# Patient Record
Sex: FEMALE | Race: WHITE
[De-identification: names, ages, dates, MRNs, and addresses within clinical notes are randomized per-mention and may not be internally consistent; named-entity substitution may affect disease eponyms.]

---

## 2019-02-21 ENCOUNTER — HOSPITAL ENCOUNTER (EMERGENCY)
Dept: HOSPITAL 1 - ED | Age: 69
Discharge: ANOTHER HOSPITAL | End: 2019-02-21
Payer: COMMERCIAL

## 2019-02-21 VITALS — SYSTOLIC BLOOD PRESSURE: 114 MMHG | DIASTOLIC BLOOD PRESSURE: 52 MMHG

## 2019-02-21 VITALS — BODY MASS INDEX: 25.71 KG/M2 | WEIGHT: 160 LBS | HEIGHT: 66 IN
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
## 2019-02-21 DIAGNOSIS — R42: ICD-10-CM

## 2019-02-21 DIAGNOSIS — E11.9: ICD-10-CM

## 2019-02-21 DIAGNOSIS — Z87.19: ICD-10-CM

## 2019-02-21 DIAGNOSIS — E86.0: ICD-10-CM

## 2019-02-21 DIAGNOSIS — Z88.8: ICD-10-CM
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
## 2019-02-21 DIAGNOSIS — Z88.2: ICD-10-CM
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
## 2019-02-21 DIAGNOSIS — F17.210: ICD-10-CM

## 2019-02-21 DIAGNOSIS — Z88.5: ICD-10-CM

## 2019-02-21 DIAGNOSIS — I10: ICD-10-CM

## 2019-02-21 DIAGNOSIS — Z88.0: ICD-10-CM

## 2019-02-21 DIAGNOSIS — S32.019A: Primary | ICD-10-CM

## 2019-02-21 LAB
ALBUMIN SERPL-MCNC: 3.5 G/DL (ref 3.4–5)
ALP SERPL-CCNC: 137 U/L (ref 46–116)
ALT SERPL-CCNC: 58 U/L (ref 14–59)
AST SERPL-CCNC: 65 U/L (ref 15–37)
BASOPHILS NFR BLD: 0 % (ref 0–2)
BILIRUB SERPL-MCNC: 0.96 MG/DL (ref 0.2–1)
BUN SERPL-MCNC: 30 MG/DL (ref 7–18)
CALCIUM SERPL-MCNC: 9.6 MG/DL (ref 8.5–10.1)
CHLORIDE SERPL-SCNC: 100 MMOL/L (ref 98–107)
CO2 SERPL-SCNC: 25.8 MMOL/L (ref 21–32)
CREAT SERPL-MCNC: 1.4 MG/DL (ref 0.6–1)
DACRYOCYTES BLD QL SMEAR: (no result)
ERYTHROCYTE [DISTWIDTH] IN BLOOD BY AUTOMATED COUNT: 17.5 % (ref 11.5–14.5)
GFR SERPLBLD BASED ON 1.73 SQ M-ARVRAT: 40 ML/MIN
GLUCOSE SERPL-MCNC: 114 MG/DL (ref 74–106)
MICROSCOPIC UR-IMP: NO
MONOCYTES NFR BLD: 34 % (ref 0–7)
NEUTS BAND NFR BLD: 5 % (ref 0–10)
NEUTS SEG NFR BLD MANUAL: 45 % (ref 37–75)
OVALOCYTES BLD QL SMEAR: (no result)
PLAT MORPH BLD: (no result)
PLATELET # BLD: 115 X10^3MCL (ref 130–400)
POTASSIUM SERPL-SCNC: 4.2 MMOL/L (ref 3.5–5.1)
PROT SERPL-MCNC: 8.2 G/DL (ref 6.4–8.2)
RBC # UR STRIP.AUTO: NEGATIVE /UL
RBC MORPH BLD: (no result)
SODIUM SERPL-SCNC: 135 MMOL/L (ref 136–145)
UA SPECIFIC GRAVITY: 1.01 (ref 1–1.03)

## 2019-03-27 ENCOUNTER — HOSPITAL ENCOUNTER (INPATIENT)
Dept: HOSPITAL 1 - ED | Age: 69
LOS: 5 days | Discharge: NURSING, REHAB OR LONG-TERM CARE | DRG: 871 | End: 2019-04-01
Attending: INTERNAL MEDICINE | Admitting: INTERNAL MEDICINE
Payer: COMMERCIAL

## 2019-03-27 VITALS — DIASTOLIC BLOOD PRESSURE: 44 MMHG | SYSTOLIC BLOOD PRESSURE: 93 MMHG

## 2019-03-27 VITALS — DIASTOLIC BLOOD PRESSURE: 36 MMHG | SYSTOLIC BLOOD PRESSURE: 110 MMHG

## 2019-03-27 VITALS
WEIGHT: 156 LBS | BODY MASS INDEX: 24.48 KG/M2 | HEIGHT: 67 IN | HEIGHT: 67 IN | BODY MASS INDEX: 24.48 KG/M2 | WEIGHT: 156 LBS

## 2019-03-27 VITALS — SYSTOLIC BLOOD PRESSURE: 83 MMHG | DIASTOLIC BLOOD PRESSURE: 28 MMHG

## 2019-03-27 VITALS — DIASTOLIC BLOOD PRESSURE: 53 MMHG | SYSTOLIC BLOOD PRESSURE: 97 MMHG

## 2019-03-27 DIAGNOSIS — K72.90: ICD-10-CM

## 2019-03-27 DIAGNOSIS — E86.0: ICD-10-CM

## 2019-03-27 DIAGNOSIS — J69.0: ICD-10-CM

## 2019-03-27 DIAGNOSIS — K85.90: ICD-10-CM

## 2019-03-27 DIAGNOSIS — A41.9: Primary | ICD-10-CM

## 2019-03-27 DIAGNOSIS — N17.9: ICD-10-CM

## 2019-03-27 LAB
ALBUMIN SERPL-MCNC: 3.8 G/DL (ref 3.4–5)
ALP SERPL-CCNC: 117 U/L (ref 46–116)
ALT SERPL-CCNC: 38 U/L (ref 14–59)
AST SERPL-CCNC: 41 U/L (ref 15–37)
BASOPHILS NFR BLD: 0.1 % (ref 0–2)
BILIRUB SERPL-MCNC: 1.68 MG/DL (ref 0.2–1)
BUN SERPL-MCNC: 112 MG/DL (ref 7–18)
CALCIUM SERPL-MCNC: 10 MG/DL (ref 8.5–10.1)
CHLORIDE SERPL-SCNC: 89 MMOL/L (ref 98–107)
CO2 SERPL-SCNC: 11.9 MMOL/L (ref 21–32)
CREAT SERPL-MCNC: 7.4 MG/DL (ref 0.6–1)
ERYTHROCYTE [DISTWIDTH] IN BLOOD BY AUTOMATED COUNT: 17.9 % (ref 11.5–14.5)
GFR SERPLBLD BASED ON 1.73 SQ M-ARVRAT: 6 ML/MIN
GLUCOSE SERPL-MCNC: 144 MG/DL (ref 74–106)
MICROSCOPIC UR-IMP: YES
PLATELET # BLD: 130 X10^3MCL (ref 130–400)
POTASSIUM SERPL-SCNC: 4.9 MMOL/L (ref 3.5–5.1)
PROT SERPL-MCNC: 8.2 G/DL (ref 6.4–8.2)
RBC # UR STRIP.AUTO: NEGATIVE /UL
SODIUM SERPL-SCNC: 125 MMOL/L (ref 136–145)
UA SPECIFIC GRAVITY: 1.02 (ref 1–1.03)

## 2019-03-27 NOTE — NUR
UPON ASSESSMENT OF SKIN WHILE PERFORMING MOISE CATHETER INSERTION SKIN
BREAKDOWN NOTED WITHIN THE GLUTEAL FOLD. PT POSITIONS SELF FOR COMFORT WHEN
NEEDED.

## 2019-03-27 NOTE — NUR
PT BROUGHT IN BY [facility] AFTER CALLING 911 FOR FLANK PAIN. PER MEDICS PT
HAS NOT URINATED IN 24HRS. PT STATES THAT SHE BEGAN AN UNRECALLED ANTIBIOTIC
YESTERDAY AROUND 1200 AND HASN'T BEEN ABLE TO URINATE SINCE THEN. PT STATES
THAT SHE WAS DIAGNOSED WITH A BLADDER INFECTION THE LAST TIME SHE SAW HER PCP
WHICH WAS LAST WEEK. PT STATES THAT SHE ALSO HAS BEEN VOMITING X2 DAYS. PT
ACTIVELY VOMITING DURING ASSESSMENT. PT NOTED WITH THICK YELLOW/GREEN VOMIT. PT
AOX3. WHEN ASKED PT WHAT THE MONTH IS SHE STATES "APRIL", PT ASKED WHO THE [place]
PRESIDENT IS AND PT STATES "I DON'T KNOW". SEVERAL MINUTES LATER PT STATES
"TYSHAWN CARRILLO, HOW COULD I FORGET". PT REORIENTED APPROPRIATELY. PT ABDOMEN
SOFT AND TENDER TO PALPATION IN MIDDLE AND RLQ. PT ALSO STATES TO CHRONIC BACK
PAIN. PT LIVES AT HOME BUT STATES THAT HER NEIGHBOR BROOKE HELPS HER OUT. PT
SLOW TO ANSWER QUESTIONS AND HAS TO BE REFOCUSED OFTEN.

## 2019-03-27 NOTE — NUR
PT RETURNED TO [facility] FROM CT WITHOUT INCIDENT. IV FLUIDS INFUSING NO PROB. PT IS
AAOX3, CALM AND COOPERATIVE, PT ABLE TO FOLLOW COMMANDS AND VERBALIZE NEEDS. PT
IN NO DISTRESS, RESP E/U. PT ON FULL CM, VSS, NSR, PT GOWNED AND IN POSITION OF
COMFORT, BED IN LOWEST POSITION AND CALL BELL WITHIN REACH. WILL CONT TO
MONITOR.

## 2019-03-27 NOTE — NUR
PT TRANSPORTED TO TELE FLOOR BY HAI SON AND NACHO EMT ON PORTABLE CM NO
DISTRESS. IVF INFUSING WITH NO PROBLEM. NAKUL SON WILL CONTINUE CLINDAMYCIN
INFUSION AND WILL START VANCOMYCIN AS ORDERED AND DISCUSSED WITH HER. PT DC'D
FROM ED IN NO DISTRESS.

## 2019-03-27 NOTE — NUR
PT LAYING DOWN IN BED RESTING. HOB UP, RESPIRATIONS EVEN AND UNLABORED ON ROOM
AIR. PT LOOKS TO BE IN NO ACUTE DISTRESS AT THIS TIME. FAMILY MEMBERS AT
BEDSIDE. PT DENIES ANY NAUSEA AT THIS TIME. BED IN LOWST POSITION. CALL LIGHT
WITHIN REACH. WILL ENDORSE TO ONCOMING SHIFT.

## 2019-03-27 NOTE — NUR
PT IS RESTING IN BED. WITH FAMILY AT BEDSIDE. ALERT AND ORIENTED X 3. WITH
SOME FORGETFULNESS AND CONFUSION. CLEAR LUNG SOUNDS ON AUSCULTATIONS
BILATERALLY. ROOM AIR. GENERALIZED WEAKNESS. STILL HAS IV SITE HEPLOCK IN THE
LEFT UPPER ARM. STILL GETTING FLAGYL IV AND LEVAQUIN IV. STILL HAVING BACK
PAIN 6/10. WILL MONITOR.

## 2019-03-27 NOTE — NUR
PERFORMED MOISE PLACEMENT USING STERILE TECHNIQUE. INITIAL URINE OUTPUT 100ML.
DR KUHN AWARE. URINE SENT TO LAB FOR TESTING.

## 2019-03-27 NOTE — NUR
PT MEDICATED PER EMAR. PT RESTING IN A POSITION OF COMFORT AT THIS TIME. RESP
EVEN AND UNLABORED, NO ACUTE DISTRESS NOTED. PT ON FULL CARDIAC MONITOR.

## 2019-03-27 NOTE — NUR
PT TRANSFERED FROM ED VIA GURNEY ACCOMPANIED BY RN. PT IS AWAKE, ALERT AND
ORIENTED. PT ORIENTED TO ROOM AND UNIT. PT LOOKS TO BE IN NO ACUTE DISTRESS
AT THIS TIME. PT COMPLAINING OF 10/10 PAIN IN THE BACK. IV TO LEFT SHOULDER IS
PATENT WITH NO SIGNS OF ERYTHEMA OR SWELLING. CALL LIGHT WITHIN REACH. WILL
CONTINUE TO MONTIOR.

## 2019-03-27 NOTE — NUR
PT RESTING IN A POSITION OF COMFORT. RESP EVEN AND UNLABORED, NO ACUTE DISTRESS
NOTED. PT REQUESTING PAIN MEDIATION, PER DR OLSON WE HAVE TO WAIT FOR LAB
RESULTS TO MEDICATE FOR PAIN. PT POSITIONED FOR COMFORT.

## 2019-03-28 VITALS — SYSTOLIC BLOOD PRESSURE: 98 MMHG | DIASTOLIC BLOOD PRESSURE: 39 MMHG

## 2019-03-28 VITALS — SYSTOLIC BLOOD PRESSURE: 138 MMHG | DIASTOLIC BLOOD PRESSURE: 30 MMHG

## 2019-03-28 VITALS — SYSTOLIC BLOOD PRESSURE: 103 MMHG | DIASTOLIC BLOOD PRESSURE: 50 MMHG

## 2019-03-28 VITALS — SYSTOLIC BLOOD PRESSURE: 109 MMHG | DIASTOLIC BLOOD PRESSURE: 47 MMHG

## 2019-03-28 LAB
BASOPHILS NFR BLD: 0 % (ref 0–2)
BUN SERPL-MCNC: 105 MG/DL (ref 7–18)
BURR CELLS BLD QL SMEAR: (no result)
CALCIUM SERPL-MCNC: 9.1 MG/DL (ref 8.5–10.1)
CHLORIDE SERPL-SCNC: 94 MMOL/L (ref 98–107)
CO2 SERPL-SCNC: 16.6 MMOL/L (ref 21–32)
CREAT SERPL-MCNC: 6.8 MG/DL (ref 0.6–1)
DACRYOCYTES BLD QL SMEAR: (no result)
ERYTHROCYTE [DISTWIDTH] IN BLOOD BY AUTOMATED COUNT: 18.3 % (ref 11.5–14.5)
GFR SERPLBLD BASED ON 1.73 SQ M-ARVRAT: 6 ML/MIN
GLUCOSE SERPL-MCNC: 109 MG/DL (ref 74–106)
MONOCYTES NFR BLD: 18 % (ref 0–7)
NEUTS BAND NFR BLD: 5 % (ref 0–10)
NEUTS SEG NFR BLD MANUAL: 71 % (ref 37–75)
OVALOCYTES BLD QL SMEAR: (no result)
PLAT MORPH BLD: (no result)
PLATELET # BLD: 124 X10^3MCL (ref 130–400)
POTASSIUM SERPL-SCNC: 4.9 MMOL/L (ref 3.5–5.1)
RBC MORPH BLD: (no result)
SODIUM SERPL-SCNC: 129 MMOL/L (ref 136–145)

## 2019-03-28 NOTE — NUR
NOTED A SMALL OPEN WOUND TO PT LEFT BUTTOCKS. PICTURE IN CHART. WOUND OPEN TO
AIR. PT MADE COMFORTABLE IN BED. CALL LIGHT WITHIN REACH. WILL CONTINUE TO
MONTIOR.

## 2019-03-28 NOTE — NUR
RECEIVED PT IN BED AAOX4 PT'S LETHRAGIC AT THE MOMENT , LUNG SOUNDS CTA ABD
SOFT BS ACTIVE X4, MOISE TO GRAVITY DRAINING WELL , HL INTACT FLUSHING WELL ,
WILL CON'T TO MONITOR AND ASSIST PT WITH CARE .

## 2019-03-28 NOTE — NUR
PT C/O MODERATE BACK PAIN 10/10. MEDICATED WITH DILAUDID 0.5 MG IVPUSH. WILL
MONITOR. PT TOOK HER LACTULOSE THIS MORNING THAT WAS REFUSED LAST NIGHT DOSE.
WILL MONITOR.

## 2019-03-28 NOTE — NUR
PT IS LAYING DOWN IN BED WITH HOB UP. PT LOOKS TO BE IN NO ACUTE DISTRESS AT
THIS TIME. FAMILY MEMBERS AT BEDSIDE. PT REPOSITIONED. MOISE CARE COMPLETED.
IV SITE IS PATENT WITH NO SIGNS OF ERYTHEMA OR SWELLING. CALL LIGHT WITHIN
REACH. WILL ENDORSE TO ONCOMING SHIFT.

## 2019-03-28 NOTE — NUR
PT IS RESTING. HAS INTERMITTENT MODERATE TO SEVERE BACK PAIN DUE TO TI
COMPRESSION. PAIN MANAGEMENT IS DIALUDID 0.5 MG IVPUSH. AND WAS EFFECTIVE.
STILL GETTING FLAGYL AND LEVAQUIN IV WITHOUT ADVERSE REACTION. MADE
COMFORTABLE IN BED. TURN AND REPOSITION.

## 2019-03-29 VITALS — SYSTOLIC BLOOD PRESSURE: 91 MMHG | DIASTOLIC BLOOD PRESSURE: 41 MMHG

## 2019-03-29 VITALS — DIASTOLIC BLOOD PRESSURE: 47 MMHG | SYSTOLIC BLOOD PRESSURE: 98 MMHG

## 2019-03-29 VITALS — DIASTOLIC BLOOD PRESSURE: 43 MMHG | SYSTOLIC BLOOD PRESSURE: 101 MMHG

## 2019-03-29 VITALS — SYSTOLIC BLOOD PRESSURE: 94 MMHG | DIASTOLIC BLOOD PRESSURE: 47 MMHG

## 2019-03-29 VITALS — DIASTOLIC BLOOD PRESSURE: 56 MMHG | SYSTOLIC BLOOD PRESSURE: 113 MMHG

## 2019-03-29 LAB
ALBUMIN SERPL-MCNC: 3 G/DL (ref 3.4–5)
ALP SERPL-CCNC: 112 U/L (ref 46–116)
ALT SERPL-CCNC: 28 U/L (ref 14–59)
AST SERPL-CCNC: 37 U/L (ref 15–37)
BASOPHILS NFR BLD: 0 % (ref 0–2)
BILIRUB DIRECT SERPL-MCNC: 0.52 MG/DL (ref 0–0.2)
BILIRUB SERPL-MCNC: 1.41 MG/DL (ref 0.2–1)
BUN SERPL-MCNC: 79 MG/DL (ref 7–18)
BURR CELLS BLD QL SMEAR: (no result)
CALCIUM SERPL-MCNC: 9 MG/DL (ref 8.5–10.1)
CHLORIDE SERPL-SCNC: 100 MMOL/L (ref 98–107)
CO2 SERPL-SCNC: 23.8 MMOL/L (ref 21–32)
CREAT SERPL-MCNC: 3.8 MG/DL (ref 0.6–1)
ERYTHROCYTE [DISTWIDTH] IN BLOOD BY AUTOMATED COUNT: 18.6 % (ref 11.5–14.5)
GFR SERPLBLD BASED ON 1.73 SQ M-ARVRAT: 13 ML/MIN
GLUCOSE SERPL-MCNC: 144 MG/DL (ref 74–106)
LIPASE SERPL-CCNC: 1220 IU/L (ref 73–393)
MONOCYTES NFR BLD: 18 % (ref 0–7)
NEUTS BAND NFR BLD: 1 % (ref 0–10)
NEUTS SEG NFR BLD MANUAL: 80 % (ref 37–75)
PLAT MORPH BLD: (no result)
PLATELET # BLD: 86 X10^3MCL (ref 130–400)
POTASSIUM SERPL-SCNC: 3.3 MMOL/L (ref 3.5–5.1)
PROT SERPL-MCNC: 6.6 G/DL (ref 6.4–8.2)
RBC MORPH BLD: (no result)
SODIUM SERPL-SCNC: 136 MMOL/L (ref 136–145)

## 2019-03-29 NOTE — NUR
NO CHANGES OF CONDITION NOTED, ALL DUE MEDS GIVEN NO REACTION NOTED, HL INTACT
FLUSHING WELL . MOISE TO GRAVITY DRAINING WELL. CALL LIGHT WITHIN PT'S REACH ,
TURNED PT Q2 HRS.

## 2019-03-29 NOTE — NUR
PT RESTING IN BED WITH NO APPARENT SIGNS OF DISTRESS. A/A/O./X4, SPEECH CLEAR
AND APPRORPIATE. DENIES HA/DIZZINESS. ON TELE, DENIES CHEST PAIN/PRESSURE.
PALP PULSES. RESPIRATIONS EQUAL AND UNLABORED. ON RA, DENIES SOB. ABDOMEN SOFT
AND NONTEDER. ACTIVE BS. EMILY N/V. PT REPORTS DIARRHEA AT THIS TIME. MOISE
CATHETER WITH LEILANI URINE DRAINING TO GRAVITY. GENERALIZED WEAKNESS. BESREST
AT THIS TIME. OPTIFOAM NOTED TO BUTTOCKS, CDI. PT C/O OF PAIN, WILL MEDICATE
ACCORDINGLY. IV PATENT AND INTACT. BED IN LOW POSITION. CALL LIGHT IN REACH.
WILL COTNINUE TO MONITIOR

## 2019-03-29 NOTE — NUR
AM MEDICATIONS GIVEN PT TOLERATED WELL. RESRPIATIONS EQUAL AND UNLABORED. ON
RA, DENIES SOB. BED IN LOW POSITION. CALL LIGHT IN REACH. WILL COTNINE TO
MONITOR

## 2019-03-29 NOTE — NUR
PT RESTING IN BED WITH NO APPARENT SIGNS OF DISTRESS. ON TELE. DENIES CHEST
PAIN/PRESSURE. RESPIRAITONS EQUAL AND UNLABORED. ON RA, DENIES SOB.
RESPIRATIONS EQUAL AND UNLABORED. ON RA, DENIES SOB. MOISE CATH IN PLACE QWITH
LEILANI URINE, WNL. GENERALIZED WEAKENSS. NO ACUTE SKIN CHANGES NOTED. IV PATENT
AND INTACT. BED IN LOW POSITION. CALL LIGHT IN REACH. WILL ENDORSE TO NIGHT
SHIFT RN

## 2019-03-29 NOTE — NUR
RECEIVED REPORT FROM YAEL SON. PT IS RESTING IN BED. FLAGYL INFUSING. STARTED
THE IV D5NS+20KCL AT 80 ML PER HOUR,  PT HAS FAIR APPETITE AND HX OF
DIABETES. AND HER K- LEVEL 3.3. TURNED AND REPOSITIONED. WILL MONITOR. IV IN
THE RIGHT WRIST.

## 2019-03-29 NOTE — NUR
PM MEDICATIONS GIVEN. PT TOLERATED WELL. RESPIRATIONS EQUAL AND UNLABNORED.ON
RA, DENIES SOB. BED IN LOW POSITION. CALL LIGHT IN REACH. WILL CONTINUE TO
MONITOR

## 2019-03-29 NOTE — NUR
Intervention/RD recommendation
1. Continue regular diet as ordered and as tolerated per pt. request.

## 2019-03-29 NOTE — NUR
PT C/O OF PAIN, MEDICATED PER EMAR. VITALS STABLE. RESPIRTIONS EQUAL AND
UNLABORED. ON RA, DENIES SOB. BED IN LOW POSITION. CALL LIGHT IN REACH. WILL
CONTINUE TO MONITOR

## 2019-03-29 NOTE — NUR
RC'D PT RESTING IN BED WITH NO APPARENT SIGNS OF DISTRESS. A/A/O/X4, SPEECH
CLEAR AND APPROPRIATE. DENIES HA/DIZZINESS. ON TELE, DENIES CHEST
PAIN/PRESSURE. PALP PULSES, NO EDEMA NOTED. RESPIRATIONS EQUAL AND UNLABORED.
LUNGS CTA. ON RA, DENIES SOB. ABDOMEN SOFT AND NONTENDER. ACTIVE BS. DENIES
N/V. MOISE CATHETER WITH LEILANI URINE DRAINING TO GRAVITY, WNL. GENERALIZED
WEAKNESS. AMB WTIH FWW. IV PATENT AND INTACT. BED IN LOW POSITION. CALL LIGHT
IN REACH. WILL CONTINUE TO MONITOR

## 2019-03-29 NOTE — NUR
PT RESTING IN BED WITH NO APPARENT SIGNS OF DISTRESS. RESPRIATIONS EQUAL AND
UNLABORED. DENIES DIZZINESS/HA. BED IN LOW PSOITION. CALL LIGHT IN REACH. WILL
CONTINUE TO MONITOR

## 2019-03-29 NOTE — NUR
Initial Nutrition Assessment
 
Dx: ARF, sepsis
PMHx: HTN, DM, Cirrhosis, chronic anemia, leukemia, possibly CLL
PSHx: None
Labs: (3/29/19) Na 136, K 3.3L, BG 144H, BUN 79H,  Cr 3.8H (both trending
down), Lipase 1220H, WBC 21.4H, H/H 14.8/43
BG readings (3/27-3/29):  mg/dL, all readings <180 mg/dL, insulin
coverage provided PRN.
Meds: Cephulac, Citrate of magnesia, D50%, Dilaudid, Elavil, Flagyl, Humulin,
Levquin, Norco, Protonix, Reglan, Senokot, Zofran
Diet: Regular
PO Intake: (3/29) B/L: 50% (3/28) L: 20%
Ht: 67" (170 cm)  Wt: 156# (70.8 kg)  BMI: 24.4 (WNL)
IBW: 135#  %IBW: 105%   UBW:170#
Age: 69 y/o female
Food Allergies: NKFA
Skin: Intact
Harjinder: 19
Edema: None
GI: Last BM x 1 (3/29)
 
Per H&P, pt. admitted with reports of right flank pain associated with recent
poor PO intake. Denies any nausea or vomiting upon admission. Noted with
elevated BUN, Cr, and lipase levels per provider notes. Per provider progress
notes, pt.'s abdominal pain has improved.
Abdominal/Pelvis CT scan conducted on 3/27/19 with findings of diverticulosis
w/o CT evidence of diverticulitis and peripancreatic inflammatory changes per
provider notes.
Pt. endorses improved appetite since admission and no GI distress. Reports a
-10# weight loss in 1 month due to initation of diuretics. Notified pt. of the
benefits of following CCHO diet and changing her current diet order (Regular)
for optimal BG management, but pt. declined, stating she will pick and choose
what she consumes. Recommended ONS Glucerna; states she dislikes any
nutritional supplements. Offerred to change her food preferences; food
preferences reflected on computrition.
 
T: Unintentional weight loss -10# x1 mo, poor PO intake > 3 days
Problem with: No c/o N/V/D/C
Problems with: Chewing: N Swallowing: N
Current appetite: Poor to fair
Recent wt change: -10# x1 month  %wt change: 5.8% x 1 month; significant
weight loss
Vitamin/Supplement use: None
Special diet at home: Regular
Physical activity: None at this time d/t acute illness
Education: No diet education provided during visit; pt. declined at this time.
 
Estimated Nutritional Needs Based on actual body weight 70.8 kg:
Energy: 2149-1327   kcal/d  (25-27 kcal/kg- adult maintenance)
Protein: 70-85 g/d  (1.0-1.2 g/kg)-maintenance and preservation of lean body
mass
Fluid: 1938-7482  ml/d  (1 ml/kcal-fluid balance) or per doctor
 
Nutrition Diagnosis
1. Inadequate PO intake r/t reported poor appetite AEB documented PO intake
meeting <75% estimated calorie and protein needs.
2. Unintentional weight loss r/t potential food-drug interactions AEB
subjective significant weight loss 10# x 1 month (5.8%) from new medication
(diuretic) regimen.
 
Intervention/RD recommendation
1. Continue regular diet as ordered and as tolerated per pt. request.
 
Monitor/Evaluate
Goal: PO intake at least 75 % of estimated needs
Monitor: PO intake, Labs, GI function, diet tolerance, skin integrity
F/U in 3-5 days as moderate risk (4/1-4/3)

## 2019-03-29 NOTE — NUR
PT RESTING IN BED WITH NO APPARENT SIGNS OF DISTRESS. RESPIRATIONS EQUAL AND
UNLABORED. ON RA, DENIES SOB. BED IN LOW POSITION. CALL LIGHT IN REACH. WILL
CONTINUE TO MONITOR

## 2019-03-30 VITALS — DIASTOLIC BLOOD PRESSURE: 40 MMHG | SYSTOLIC BLOOD PRESSURE: 101 MMHG

## 2019-03-30 VITALS — SYSTOLIC BLOOD PRESSURE: 114 MMHG | DIASTOLIC BLOOD PRESSURE: 62 MMHG

## 2019-03-30 VITALS — DIASTOLIC BLOOD PRESSURE: 52 MMHG | SYSTOLIC BLOOD PRESSURE: 105 MMHG

## 2019-03-30 VITALS — DIASTOLIC BLOOD PRESSURE: 53 MMHG | SYSTOLIC BLOOD PRESSURE: 111 MMHG

## 2019-03-30 VITALS — SYSTOLIC BLOOD PRESSURE: 108 MMHG | DIASTOLIC BLOOD PRESSURE: 51 MMHG

## 2019-03-30 LAB
ALBUMIN SERPL-MCNC: 2.7 G/DL (ref 3.4–5)
ALP SERPL-CCNC: 108 U/L (ref 46–116)
ALT SERPL-CCNC: 23 U/L (ref 14–59)
AST SERPL-CCNC: 32 U/L (ref 15–37)
BASOPHILS NFR BLD: 0 % (ref 0–2)
BILIRUB DIRECT SERPL-MCNC: 0.4 MG/DL (ref 0–0.2)
BILIRUB SERPL-MCNC: 1.2 MG/DL (ref 0.2–1)
BUN SERPL-MCNC: 40 MG/DL (ref 7–18)
CALCIUM SERPL-MCNC: 8 MG/DL (ref 8.5–10.1)
CHLORIDE SERPL-SCNC: 102 MMOL/L (ref 98–107)
CO2 SERPL-SCNC: 26.7 MMOL/L (ref 21–32)
CREAT SERPL-MCNC: 1.8 MG/DL (ref 0.6–1)
ERYTHROCYTE [DISTWIDTH] IN BLOOD BY AUTOMATED COUNT: 17.9 % (ref 11.5–14.5)
GFR SERPLBLD BASED ON 1.73 SQ M-ARVRAT: 30 ML/MIN
GLUCOSE SERPL-MCNC: 168 MG/DL (ref 74–106)
MONOCYTES NFR BLD: 29 % (ref 0–7)
NEUTS BAND NFR BLD: 1 % (ref 0–10)
NEUTS SEG NFR BLD MANUAL: 62 % (ref 37–75)
PLAT MORPH BLD: (no result)
PLATELET # BLD: 63 X10^3MCL (ref 130–400)
POTASSIUM SERPL-SCNC: 2.8 MMOL/L (ref 3.5–5.1)
PROT SERPL-MCNC: 6.3 G/DL (ref 6.4–8.2)
RBC MORPH BLD: NORMAL
SODIUM SERPL-SCNC: 140 MMOL/L (ref 136–145)
VARIANT LYMPHS NFR BLD MANUAL: 2 %

## 2019-03-30 NOTE — NUR
NOTIFIED BY TELE MONITOR THAT PT CURRENTLY IN SVT. PT ASYMPTOMATIC AT THIS
TIME. VITALS STABLE. WILL NOTIFY MD FOR ORDERS

## 2019-03-30 NOTE — NUR
RC'D PT RESTING IN BED WITH NO APPARENT SIGNS OF DISTRESS. A/A/O/X4, SPEECH
CLEAR AND APPROPRIATE. DENIES HA/DIZZINESS. ON TELE, DENIES CHEST
PAIN/PRESSURE. PALP PULSES, NO EDEMA NOTED. RESPIRATIONS EQUAL AND UNLABORED.
LUNGS CTA. ON RA, DENIES SOB. ABDOMEN SOFT AND NONTENDER. ACTIVE BS. DENIES
N/V. MOISE CATHETER WITH LEILANI URINE DRAINING TO GRAVITY, WNL. GENERALIZED
WEAKNESS. PT TO EVAL. PT DENIES EXCESSVE PAIN AT THIS TIME. IV PATENT AND
INTACT. BED IN LOW POSITION. CALL LIGHT IN REACH. WILL CONTINUE TO MONITOR

## 2019-03-30 NOTE — NUR
PT RESTING IN BED WITH NO APPARENT SIGNS OF DISTRESS. RESPIRATIONS EQUAL
ANDUNLABORED. ON RA, DENIES SOB. BED IN LOW POSTIINO. CALL LIGHT IN REACH.
WILL CONTINUE TO MONITOR

## 2019-03-30 NOTE — NUR
AM MEDICATIONS GIVEN. PT TOLERATED WELL. RESPIRATIONS EQUAL AND UNLABORED. ON
RA, DENIES SOB. PT DENIES EXCESSIVE PAIN AT THIS TIME. BED IN LOW POSITION.
CALL LIGHT IN REACH. WILL CONTINUE TO MONITOR

## 2019-03-30 NOTE — NUR
RC'D CALL FROM OLIVER FROM [facility] FOR UPDATE ON PT STATUS. ALL
QUESTIONS AND CONCERNS WERE ADDRESSED

## 2019-03-30 NOTE — NUR
RECIEVED REPORT FROM ADELAIDA PETERS. NURSING UPDATES. RESUMED CARE OF PT.
 
PT A&OX4. CARDIAC PREVIOUS SVT'S. CIRCULATION WNL. RESP WNL. GASTRO DIARRHEA.
GENIT F/C SECURE & INTACT LEILANI URINE. MUSCO BEDREST W/ GENERALIZED WEAKNESS.
SKIN OPEN SKIN TEAR LEFT BUTTOCKS W/ OPTIFOAM. R WRIST IV C/D/I.

## 2019-03-30 NOTE — NUR
PT IS RESTING IN BED. MORE ALERT AND TALKATIVE. MOISE CATHETER DRAINING LEILANI/
YELLOW URINE OUTPUT MODERATE AMOUNT. D5NS +20KCL AT 80 ML PER HOUR INFUSING
WELL RIGHT WRIST. PATENT AND INTACT. WILL CONTINUE TO MONITOR.

## 2019-03-30 NOTE — NUR
NOTED ON TELE MONITOR THAT PT CONVERTED BACK TO NSR. VITALS STABLE AT THIS
TIME. CHARGE NURSE NOIFIED AND MADE AWARE. PT ASYMPTOMATIC AT THIS TIME. WILL
CONTINUE TO MONTOR

## 2019-03-30 NOTE — NUR
PT RESTING IN BED WITH NO APPARENT SIGNS OF DISTRESS. ON TELE, DENIES CHEST
PAIN/PRESSURE. RESPIRATIONS EQUAL AND UNLABORED. ON RA, DENIES SOB. MOISE CATH
WITH LEILANI URINE DRAINING TO GRAVITY, WNL. BEDREST. NO ACUTE SKIN CHANGES
NOTED AT THIS TIME. PT EDUCATED ON IMPORTANCE OF REPOSITIONING. PT VERBALIZED
UNDERSTANDING OF INSTRUCTIONS. IV PATENT AND INTACT. BEDIN LOW POSITION. CALL
LIGHT IN REACH. WILL ENDORSE TO NIGHT SHIFT RN

## 2019-03-31 VITALS — SYSTOLIC BLOOD PRESSURE: 112 MMHG | DIASTOLIC BLOOD PRESSURE: 52 MMHG

## 2019-03-31 VITALS — SYSTOLIC BLOOD PRESSURE: 108 MMHG | DIASTOLIC BLOOD PRESSURE: 56 MMHG

## 2019-03-31 VITALS — DIASTOLIC BLOOD PRESSURE: 52 MMHG | SYSTOLIC BLOOD PRESSURE: 112 MMHG

## 2019-03-31 VITALS — DIASTOLIC BLOOD PRESSURE: 76 MMHG | SYSTOLIC BLOOD PRESSURE: 145 MMHG

## 2019-03-31 VITALS — DIASTOLIC BLOOD PRESSURE: 71 MMHG | SYSTOLIC BLOOD PRESSURE: 137 MMHG

## 2019-03-31 VITALS — SYSTOLIC BLOOD PRESSURE: 106 MMHG | DIASTOLIC BLOOD PRESSURE: 51 MMHG

## 2019-03-31 LAB
BUN SERPL-MCNC: 17 MG/DL (ref 7–18)
CALCIUM SERPL-MCNC: 7.6 MG/DL (ref 8.5–10.1)
CHLORIDE SERPL-SCNC: 100 MMOL/L (ref 98–107)
CO2 SERPL-SCNC: 24.1 MMOL/L (ref 21–32)
CREAT SERPL-MCNC: 1.1 MG/DL (ref 0.6–1)
ERYTHROCYTE [DISTWIDTH] IN BLOOD BY AUTOMATED COUNT: 18.2 % (ref 11.5–14.5)
GFR SERPLBLD BASED ON 1.73 SQ M-ARVRAT: 53 ML/MIN
GLUCOSE SERPL-MCNC: 157 MG/DL (ref 74–106)
LIPASE SERPL-CCNC: 340 IU/L (ref 73–393)
MAGNESIUM SERPL-MCNC: 1 MG/DL (ref 1.8–2.4)
MONOCYTES NFR BLD: 17 % (ref 0–7)
NEUTS BAND NFR BLD: 1 % (ref 0–10)
NEUTS SEG NFR BLD MANUAL: 75 % (ref 37–75)
PLAT MORPH BLD: (no result)
PLATELET # BLD: 53 X10^3MCL (ref 130–400)
POTASSIUM SERPL-SCNC: 3.3 MMOL/L (ref 3.5–5.1)
RBC MORPH BLD: (no result)
SODIUM SERPL-SCNC: 132 MMOL/L (ref 136–145)
VARIANT LYMPHS NFR BLD MANUAL: 0 %

## 2019-03-31 NOTE — NUR
[facility] CALLED AND SPOKEN WITH TYREE LOPEZ FOR PATIENT TO BE
[facility]SF[facility].

## 2019-03-31 NOTE — NUR
AAO TIMES 4. NO C/O PAIN. NO SOB. IV SITE CDI. COOPERATIVE. HER SON VISITED
WITH HER TODAY, AND HE ADVISED ME WHICH NURSING HOME THEY WOULD LIKE HER TO GO
TO. I SENT A MESSAGE TO CASE MANAGEMENT ADVISING THEM. TELE # 15 SR. SHE HAS
BEEN TURNING HERSELF WITHOUT DIFFICULTY TODAY. Z GUARD WAS PUT ON HER
BUTTOCK/SACRAL/COCCYX AREA.

## 2019-03-31 NOTE — NUR
WILL ENDORSE. PT RESTING CALMLY IN BED. NO ACUTE CHANGES FROM PREVIOUS.
 
PT A&OX4. CARDIAC PREVIOUS SVT'S. CIRCULATION WNL. RESP WNL. GASTRO DIARRHEA.
GENIT F/C SECURE & INTACT LEILANI URINE. MUSCO BEDREST W/ GENERALIZED WEAKNESS.
SKIN OPEN SKIN TEAR LEFT BUTTOCKS W/ OPTIFOAM. R WRIST IV C/D/I. PT ADMIN
NORCO 0000.

## 2019-03-31 NOTE — NUR
AAO TIMES 4. TELE # 15 SR. LUNGS CTA. NO SOB. O2 SAT ON RA 94%. BS'S ACTIVE
TIMES 4. PERIPHERAL PULSES PALPABLE. NO EDEMA. NO C/O PAIN. MOISE CATH
DRAINING LEILANI URINE TO BSD. COOPERATIVE.

## 2019-03-31 NOTE — NUR
STARTED BLADDER TRAINING AT 1400, I UNCLAMPED IT AT 1500 WHEN SHE STATED SHE
FELT SHE HAD TO URINATE. I RECLAMPED THE MOISE CATH AND LEFT IT CLAMPED UNTIL
1758. I AM LEAVING IT CLAMPED FOR CHANGE OF SHIFT, TOTAL  ML LEILANI URINE
OUT TO BSD.

## 2019-03-31 NOTE — NUR
SPOKEN WITH JAKE ON PHONE AND TOLD ELEIZER THAT HE DOES NOT WANT HIS
MOTHER(PATIENT) TO BE TRANSFER TO [facility] AND ITS ALREADY LATE TO TRANSFER PT
TONIGHT. I CALLED ELIEZER ABOUT PATIENTS SONS CONCERN AND ELIEZER STATED THAT
PATIENT IS NOT TRANSFERRING TONIGHT AND WILL FIND A PLACE FOR PATIENT
TOMORROW. PRIMARY RN AND SUP TYREE MADE AWARE.

## 2019-03-31 NOTE — NUR
REASSESSMENT OF PAIN LEVEL REPORTED TO BE 1/10. PT STATED THE PAIN PILL HELPED
A LOT. PT NOW RESTING IN BED COMFORTABLY.

## 2019-03-31 NOTE — NUR
ELIEZER FROM [facility] WANTED PTS FAMILY TO CALL HER
REGARDING THE TRANSFER. CALLED PLACED TO MONTY STUART [phone] TO GET
EDUIN JACK NUMBER. NUMBER LISTED ON FACE SHEET IS INACCURATE. 2230: I SPOKEN
WITH JAKE [phone] AND MADE HIM AWARE THAT ELIEZER FROM [facility] INSURANCE WANTED HER MOTHER (PATIENT) TO BE DISCHARGE AND TRANSFER TO
[facility] AND ELIEZER NEEDED TO TALK TO HIM. GAVE JAKE
THE PHONE NUMBER [phone] TO CALL ELIEZER. AWAITING FOR CALL BACK FROM
JAKE OR ELIEZER. PRIMARY RN LALA CALLAHAN.

## 2019-03-31 NOTE — NUR
NO CALL FROM EITHER JAKE/ELLY. MOISE CATHETER DRAIANG YELLOW URINE,
BLADDER TRAINING TOLERATING WELL.

## 2019-03-31 NOTE — NUR
PT HAD BROWN UNFORMED BM. LINENS CHANGED, PT GIVEN BED BATH. NO ACUTE CHANGES.
CALL BELL WITHIN REACH BED IN LOWEST POSITION.

## 2019-03-31 NOTE — NUR
RECEIVED AWAKE IN BED WATCHING TV. DENIES ANY PAIN/DISCOMFORT AT THIS TIME.
SKIN WARM AND DRY TO TOUCH WITH OPEN WOUND ON THE LEFT BUTTOCK, OPTIFOAM
DRESSING IN PLACE. MOISE CATH TO BSD LEX YELLOW URINE OUTPUT. BLADDER
TRAINING IN PROGRESS. IV SITE RIGHT WRIST INTACT AND PATENT. PLACED CALL LIGHT
WITHIN REACH, INSTRUCTED TO CALL FOR ANY ASSISTANCE AS NEEDED AND VERBALIZED
UNDERSTANDING.

## 2019-04-01 VITALS — SYSTOLIC BLOOD PRESSURE: 106 MMHG | DIASTOLIC BLOOD PRESSURE: 59 MMHG

## 2019-04-01 VITALS — DIASTOLIC BLOOD PRESSURE: 48 MMHG | SYSTOLIC BLOOD PRESSURE: 94 MMHG

## 2019-04-01 VITALS — DIASTOLIC BLOOD PRESSURE: 66 MMHG | SYSTOLIC BLOOD PRESSURE: 128 MMHG

## 2019-04-01 VITALS — SYSTOLIC BLOOD PRESSURE: 102 MMHG | DIASTOLIC BLOOD PRESSURE: 56 MMHG

## 2019-04-01 VITALS — DIASTOLIC BLOOD PRESSURE: 60 MMHG | SYSTOLIC BLOOD PRESSURE: 106 MMHG

## 2019-04-01 VITALS — SYSTOLIC BLOOD PRESSURE: 105 MMHG | DIASTOLIC BLOOD PRESSURE: 52 MMHG

## 2019-04-01 NOTE — NUR
PT BEING TRANSFERRED TO [facility], FAMILY AWARE. DR. GIL
AWARE. GOING TO ROOM 105 A. REPORT GIVEN TO BRIAN BULLARD.

## 2019-04-01 NOTE — NUR
EYES CLOSED, NO FACIAL GRIAMCING NOTED. RESPIRATION EVEN AND UNLABORED. NO S/S
OF ACUTE DISTRESS. CALL LIGHT WITHIN REACH.

## 2019-04-01 NOTE — NUR
BLOOD SUGAR=174MG/DL, 3UNITS HRI GIVEN AS COVERAGE. APPLE JUICE GIVEN PER
REQUEST. DUE MEDICATIONS TOLERATED WELL. NO ADVERSE REACTION NOTED FROM ATB
THERAPY.

## 2019-04-01 NOTE — NUR
RECEIVED PT FROM NOC RN. PT FOUND SITTING AT SIDE OF BED. AA/OX4. NO S/S OF
ACUTE DISTRESS. NO SOB ON ROOM AIR. NO CHEST PAIN. CALM/COOPERATIVE. NO FEVER.
NO CHILLS. NO N/V. BED IN LOW POSITION. CALL LIGHT WITHIN REACH. WILL CONT. TO
MONITOR.

## 2019-04-01 NOTE — NUR
ASSISTED PT TO AMBULATE FROM RESTROOM WITH PHYSICAL THERAPY. AMBULATED WITH
WALKER, GAIT SLOW/STEADY. ABLE TO REPOSITION IN BED INDEPENDENTLY. MINIMAL
ASSIST. AA/OX4. NO COMPLAINT OF PAIN. NO SOB ON ROOM AIR. NO CHEST PAIN.
CALM/COOPERATIVE. VISITOR AT BEDSIDE. BED IN LOW POSITION. CALL LIGHT WITHIN
REACH. INSTRUCTED TO USE CALL LIGHT TO CALL FOR ASSISTANCE PRN AND BEFORE
GETTING OOB. VERBALIZED UNDERSTANDING. FALL PREC IN PLACE. WILL
CONT. TO MONITOR.

## 2019-04-01 NOTE — NUR
PT RESTING IN BED WITH BOTH EYES CLOSED. NO S/S OF ACUTE DISTRESS. NO SOB ON
ROOM AIR. CALM/COOPERATIVE. BED IN LOW POSITION. CALL LIGHT WITHIN REACH. WILL
CONT. TO MONITOR.

## 2019-04-01 NOTE — NUR
PT PICKED UP BY [facility] FOR TRANSFER TO [facility]. VSS STABLE.
PT DENIES PAIN. NO S/S ACUTE DISTRESS. TELE MONITOR #15 RETURNED TO STATION.
ALL BELONGINGS WITH PT. ALL NEEDS MET AND ATTENDED TO.

## 2019-04-01 NOTE — NUR
LATE ENTRY: PT SAT UP TO EAT DINNER, TOLERATED WELL. NO N/V/D. PT DENIES PAIN.
NO SOB ON ROOM AIR. CALM/COOPERATIVE. NO CHEST PAIN. NO BACK PAIN. REPOSITIONS
INDEPENDENTLY. PT LAYING IN BED. AA/OX4. BED IN LOW POSITION. CALL LIGHT
WITHIN REACH. WILL ENDORSE TO ONCOMING SHIFT.

## 2019-04-01 NOTE — NUR
RECEIVED PT FROM DAY SHIFT RN. JASWINDER. DENIES HEADACHE/DIZZINESS. TELE #15
SHOWING SINUS TACHYCARDIA, DENIES CP/CHEST PRESSURE. LUNG SOUNDS CTAB, NO SOB,
BREATHING E/U ON RA. ABD SOFT/ROUND, BS ACTIVE X4 QUADS, DENIES N/V. PULSES
PALPABLE, NO EDEMA NOTED. OPTIFOAM TO R BUTTOCK, CDI. IV TO RFA CDI,
SALINE-LOCKED. PT AWARE OF DISCHARGE PLAN. CALL LIGHT WITHIN REACH. SAFETY
MEASURES IN PLACE. WILL CONTINUE TO MONITOR.

## 2019-04-01 NOTE — NUR
PT AMBULATED TO BATHROOM WITH ASSIST, GAIT SLOW BUT STEADY. APPLIED HYDROGUARD
TO R BUTTOCK, SMALL WOUND NOTED RAÚL. WILL CONTINUE TO MONITOR.

## 2020-01-13 NOTE — NUR
PT C/O OF GENERALIZED BACK PAIN, MEDICATED PER EMAR. VITALS STABL. WILL CONT
TO MONITOR Methotrexate Pregnancy And Lactation Text: This medication is Pregnancy Category X and is known to cause fetal harm. This medication is excreted in breast milk.